# Patient Record
Sex: FEMALE | Race: WHITE | NOT HISPANIC OR LATINO | URBAN - METROPOLITAN AREA
[De-identification: names, ages, dates, MRNs, and addresses within clinical notes are randomized per-mention and may not be internally consistent; named-entity substitution may affect disease eponyms.]

---

## 2024-10-24 ENCOUNTER — EMERGENCY (EMERGENCY)
Facility: HOSPITAL | Age: 89
LOS: 0 days | Discharge: HOME | End: 2024-10-25
Attending: STUDENT IN AN ORGANIZED HEALTH CARE EDUCATION/TRAINING PROGRAM
Payer: MEDICARE

## 2024-10-24 ENCOUNTER — TRANSCRIPTION ENCOUNTER (OUTPATIENT)
Age: 89
End: 2024-10-24

## 2024-10-24 VITALS
TEMPERATURE: 98 F | HEIGHT: 62 IN | RESPIRATION RATE: 18 BRPM | HEART RATE: 66 BPM | WEIGHT: 149.03 LBS | OXYGEN SATURATION: 95 % | SYSTOLIC BLOOD PRESSURE: 147 MMHG | DIASTOLIC BLOOD PRESSURE: 71 MMHG

## 2024-10-24 DIAGNOSIS — Z79.01 LONG TERM (CURRENT) USE OF ANTICOAGULANTS: ICD-10-CM

## 2024-10-24 DIAGNOSIS — E03.9 HYPOTHYROIDISM, UNSPECIFIED: ICD-10-CM

## 2024-10-24 DIAGNOSIS — R79.82 ELEVATED C-REACTIVE PROTEIN (CRP): ICD-10-CM

## 2024-10-24 DIAGNOSIS — M25.461 EFFUSION, RIGHT KNEE: ICD-10-CM

## 2024-10-24 DIAGNOSIS — I69.351 HEMIPLEGIA AND HEMIPARESIS FOLLOWING CEREBRAL INFARCTION AFFECTING RIGHT DOMINANT SIDE: ICD-10-CM

## 2024-10-24 DIAGNOSIS — I48.91 UNSPECIFIED ATRIAL FIBRILLATION: ICD-10-CM

## 2024-10-24 DIAGNOSIS — M25.561 PAIN IN RIGHT KNEE: ICD-10-CM

## 2024-10-24 LAB
ALBUMIN SERPL ELPH-MCNC: 2.9 G/DL — LOW (ref 3.3–5)
ALP SERPL-CCNC: 74 U/L — SIGNIFICANT CHANGE UP (ref 40–120)
ALT FLD-CCNC: 16 U/L — SIGNIFICANT CHANGE UP (ref 12–78)
ANION GAP SERPL CALC-SCNC: 8 MMOL/L — SIGNIFICANT CHANGE UP (ref 5–17)
AST SERPL-CCNC: 24 U/L — SIGNIFICANT CHANGE UP (ref 15–37)
BASOPHILS # BLD AUTO: 0.02 K/UL — SIGNIFICANT CHANGE UP (ref 0–0.2)
BASOPHILS NFR BLD AUTO: 0.2 % — SIGNIFICANT CHANGE UP (ref 0–2)
BILIRUB SERPL-MCNC: 1.4 MG/DL — HIGH (ref 0.2–1.2)
BUN SERPL-MCNC: 19 MG/DL — SIGNIFICANT CHANGE UP (ref 7–23)
CALCIUM SERPL-MCNC: 8.9 MG/DL — SIGNIFICANT CHANGE UP (ref 8.5–10.1)
CHLORIDE SERPL-SCNC: 103 MMOL/L — SIGNIFICANT CHANGE UP (ref 96–108)
CO2 SERPL-SCNC: 25 MMOL/L — SIGNIFICANT CHANGE UP (ref 22–31)
CREAT SERPL-MCNC: 1 MG/DL — SIGNIFICANT CHANGE UP (ref 0.5–1.3)
EGFR: 54 ML/MIN/1.73M2 — LOW
EOSINOPHIL # BLD AUTO: 0.02 K/UL — SIGNIFICANT CHANGE UP (ref 0–0.5)
EOSINOPHIL NFR BLD AUTO: 0.2 % — SIGNIFICANT CHANGE UP (ref 0–6)
ERYTHROCYTE [SEDIMENTATION RATE] IN BLOOD: 30 MM/HR — HIGH (ref 0–20)
GLUCOSE SERPL-MCNC: 159 MG/DL — HIGH (ref 70–99)
HCT VFR BLD CALC: 39.3 % — SIGNIFICANT CHANGE UP (ref 34.5–45)
HCT VFR BLD CALC: 40 % — SIGNIFICANT CHANGE UP (ref 34.5–45)
HGB BLD-MCNC: 12.8 G/DL — SIGNIFICANT CHANGE UP (ref 11.5–15.5)
HGB BLD-MCNC: 13.1 G/DL — SIGNIFICANT CHANGE UP (ref 11.5–15.5)
IMM GRANULOCYTES NFR BLD AUTO: 0.3 % — SIGNIFICANT CHANGE UP (ref 0–0.9)
LYMPHOCYTES # BLD AUTO: 2.76 K/UL — SIGNIFICANT CHANGE UP (ref 1–3.3)
LYMPHOCYTES # BLD AUTO: 28.2 % — SIGNIFICANT CHANGE UP (ref 13–44)
MCHC RBC-ENTMCNC: 28.8 PG — SIGNIFICANT CHANGE UP (ref 27–34)
MCHC RBC-ENTMCNC: 29.2 PG — SIGNIFICANT CHANGE UP (ref 27–34)
MCHC RBC-ENTMCNC: 32.6 G/DL — SIGNIFICANT CHANGE UP (ref 32–36)
MCHC RBC-ENTMCNC: 32.8 G/DL — SIGNIFICANT CHANGE UP (ref 32–36)
MCV RBC AUTO: 88.3 FL — SIGNIFICANT CHANGE UP (ref 80–100)
MCV RBC AUTO: 89.1 FL — SIGNIFICANT CHANGE UP (ref 80–100)
MONOCYTES # BLD AUTO: 1.34 K/UL — HIGH (ref 0–0.9)
MONOCYTES NFR BLD AUTO: 13.7 % — SIGNIFICANT CHANGE UP (ref 2–14)
NEUTROPHILS # BLD AUTO: 5.61 K/UL — SIGNIFICANT CHANGE UP (ref 1.8–7.4)
NEUTROPHILS NFR BLD AUTO: 57.4 % — SIGNIFICANT CHANGE UP (ref 43–77)
NRBC # BLD: 0 /100 WBCS — SIGNIFICANT CHANGE UP (ref 0–0)
NRBC # BLD: 0 /100 WBCS — SIGNIFICANT CHANGE UP (ref 0–0)
PLATELET # BLD AUTO: 173 K/UL — SIGNIFICANT CHANGE UP (ref 150–400)
PLATELET # BLD AUTO: 178 K/UL — SIGNIFICANT CHANGE UP (ref 150–400)
POTASSIUM SERPL-MCNC: 4.7 MMOL/L — SIGNIFICANT CHANGE UP (ref 3.5–5.3)
POTASSIUM SERPL-SCNC: 4.7 MMOL/L — SIGNIFICANT CHANGE UP (ref 3.5–5.3)
PROT SERPL-MCNC: 7.3 GM/DL — SIGNIFICANT CHANGE UP (ref 6–8.3)
RBC # BLD: 4.45 M/UL — SIGNIFICANT CHANGE UP (ref 3.8–5.2)
RBC # BLD: 4.49 M/UL — SIGNIFICANT CHANGE UP (ref 3.8–5.2)
RBC # FLD: 15.3 % — HIGH (ref 10.3–14.5)
RBC # FLD: 15.4 % — HIGH (ref 10.3–14.5)
SODIUM SERPL-SCNC: 136 MMOL/L — SIGNIFICANT CHANGE UP (ref 135–145)
WBC # BLD: 9.49 K/UL — SIGNIFICANT CHANGE UP (ref 3.8–10.5)
WBC # BLD: 9.78 K/UL — SIGNIFICANT CHANGE UP (ref 3.8–10.5)
WBC # FLD AUTO: 9.49 K/UL — SIGNIFICANT CHANGE UP (ref 3.8–10.5)
WBC # FLD AUTO: 9.78 K/UL — SIGNIFICANT CHANGE UP (ref 3.8–10.5)

## 2024-10-24 PROCEDURE — 99285 EMERGENCY DEPT VISIT HI MDM: CPT

## 2024-10-24 PROCEDURE — 73562 X-RAY EXAM OF KNEE 3: CPT | Mod: 26,RT

## 2024-10-24 RX ORDER — ACETAMINOPHEN 325 MG
975 TABLET ORAL ONCE
Refills: 0 | Status: COMPLETED | OUTPATIENT
Start: 2024-10-24 | End: 2024-10-24

## 2024-10-24 RX ADMIN — Medication 975 MILLIGRAM(S): at 16:35

## 2024-10-24 RX ADMIN — Medication 975 MILLIGRAM(S): at 17:40

## 2024-10-24 NOTE — ED PROVIDER NOTE - CLINICAL SUMMARY MEDICAL DECISION MAKING FREE TEXT BOX
89-year-old female PMH CVA with residual right-sided weakness, A-fib on Eliquis, metoprolol, digoxin presenting with right knee pain and swelling and difficulty ambulating x 2 days.  Patient had stroke and of August,  had been going to PT therapy, has a new physical therapist come to the house 2 days ago and noticed swelling and pain after session.  Was unable to ambulate yesterday.  Denies fevers or chills.  PE as above, differential diagnosis including but not limited to 89-year-old female PMH CVA with residual right-sided weakness, A-fib on Eliquis, metoprolol, digoxin presenting with right knee pain and swelling and difficulty ambulating x 2 days.  Patient had stroke and of August,  had been going to PT therapy, has a new physical therapist come to the house 2 days ago and noticed swelling and pain after session.  Was unable to ambulate yesterday.  Denies fevers or chills.  PE as above, possible overuse with new PT, given mild warmth and dec ROM will consult ortho for concern of infectious process, plan for labs, declining pain control at this time.

## 2024-10-24 NOTE — ED PROVIDER NOTE - NSFOLLOWUPCLINICS_GEN_ALL_ED_FT
[facility]  Rheumatology  [address]  [place] [address]  Phone: [phone]  Fax:     [facility] Sports Medicine  Sports Medicine  [address] [place] [address]  Phone: [phone]  Fax:

## 2024-10-24 NOTE — ED PROVIDER NOTE - ATTENDING APP SHARED VISIT CONTRIBUTION OF CARE
89F pmhx CVA, afib on eliquis, recently in rehab, who presents for progressive right knee swelling/pain duration x 2 days. Reports recently started with new physical therapist 2 days ago. Denies fever, chest pain, sob  On exam pt is aox3, nad, heart rrr, normal respirations, abd soft ntnd, +limited R knee ROM due to swelling/pain overlying right suprapatellar region without overlying rash, + warmth, distal pulses intact, no sensory deficits  plan - xray knee, suspected arthritic joint effusion, esr/crp to further risk stratify to rule out septic arthritis given difficulty with ROM and warmth, ortho consulted.

## 2024-10-24 NOTE — ED ADULT NURSE NOTE - OBJECTIVE STATEMENT
Patient is alert and oriented x4. Came in for right knee pain after doing physical therapy 2 days ago. No falls or trauma. Right knee swollen. No redness.

## 2024-10-24 NOTE — ED ADULT NURSE NOTE - NSFALLHARMRISKINTERV_ED_ALL_ED
Assistance OOB with selected safe patient handling equipment if applicable/Assistance with ambulation/Communicate risk of Fall with Harm to all staff, patient, and family/Monitor gait and stability/Provide visual cue: red socks, yellow wristband, yellow gown, etc/Reinforce activity limits and safety measures with patient and family/Bed in lowest position, wheels locked, appropriate side rails in place/Call bell, personal items and telephone in reach/Instruct patient to call for assistance before getting out of bed/chair/stretcher/Non-slip footwear applied when patient is off stretcher/[place] to call system/Physically safe environment - no spills, clutter or unnecessary equipment/Purposeful Proactive Rounding/Room/bathroom lighting operational, light cord in reach

## 2024-10-24 NOTE — ED PROVIDER NOTE - PROGRESS NOTE DETAILS
Colletta NP- case discussed with ortho, requesting crp result prior to intervention. Patient agreeable with plan to await result. Davila DO: CRP still without result, pt NPO , d/w ortho who recommended pt to wait for CRP as if not significantly elevated then they would recommend against joint aspiration due to increased risk of introducing infcn . core lab was contacted at [phone] - states specimen was received at 12AM and estimates 1 hour til result, pt frustrated but updated and her R knee swelling is much improved and ROM much improved, given improvement with minimal intervention, suspicion for septic arthritis is now much lower than initial presentation as initially could not range joint - pt and son updated, they are willing to wait an hour but if result does not come back, they would like to be discharged, shared decision making discussed. Pt informed I will be signing out, s/o to evening team for follow up crp/ortho recommendations - pt signed out to me by Dr. Davila:    pt CRp elevated, ortho was repaged and make decision to tap knee. cell count not concerning per ortho, likely arthritis vs arthropathy. will give ACE wrap, give rheum and sports f/u

## 2024-10-24 NOTE — ED PROVIDER NOTE - PATIENT PORTAL LINK FT
You can access the FollowMyHealth Patient Portal offered by [facility] by registering at the following website: [email or web address]. By joining [facility]’s FollowMyHealth portal, you will also be able to view your health information using other applications (apps) compatible with our system.

## 2024-10-24 NOTE — CONSULT NOTE ADULT - SUBJECTIVE AND OBJECTIVE BOX
Patient is a 89yFemale who presents to ED with right knee painful effusion. Patient has a history of afib on eliquis and recent CVA. Had a stroke in August, developed right sided weakness and has been working in rehab and improving. Patient was discharged from rehab two weeks ago and has been having home PT. A new physical therapist came two days ago and afterwards, her aide has noticed that the patient's right knee is now swollen and very painful. Patient denies any recent trauma, numbness, or tingling. Denies any fevers or chills. States inability to walk immediately following the knee swelling. Denies having any other pain elsewhere besides her usual knee arthritic pain. No other orthopedic concerns at this time.    Medical Hx:      Meds:      Allergies:  No Known Allergies      PHYSICAL EXAM:  T(C): 36.6 (10-24-24 @ 14:19), Max: 36.6 (10-24-24 @ 14:19)  HR: 66 (10-24-24 @ 14:19) (66 - 66)  BP: 147/71 (10-24-24 @ 14:19) (147/71 - 147/71)  RR: 18 (10-24-24 @ 14:19) (18 - 18)  SpO2: 95% (10-24-24 @ 14:19) (95% - 95%)    Gen: NAD    Right Lower Extremity:  Skin clean and dry  Effusion present surrounding knee, nonerythematous, nontender to palpation, warm compared to contralateral side  TTP along joint line  Active ROM 0-30, passive ROM 0-30 limited by pain  No pain with micromotion  Able to SLR  Soft compartments  Negative calf ttp  +EHL/FHL/TA/GSc  SILT SPN, DPN, Tib, Saph, Eliana  DP+       Secondary Survey:   LLE/RUE/LUE: No TTP over bony prominences, SILT, palpable pulses, full/painless range of motion, compartments soft    Spine: No bony tenderness. No palpable stepoffs.     Imaging - XR R knee: no acute fractures, effusion present, bone on bone osteoarthritis, personal read      A/P: 89yFemale w/ right knee effusion, afebrile, no pain with micromotion, reassuring clinical exam, WBC normal, low suspicion for septic arthritis, more likely related to hemarthrosis secondary to Eliquis and osteoarthritis exacerbation    - Imaging findings reviewed and discussed with patient  - NWB LLE  - PT/OT  - Pain meds as needed  - Ice as tolerated  - No need for abx at this time  - Keep patient NPO for now  - FU ESR/CRP - ordered  - Based on ESR, may decide whether or not to tap the patient's knee        - All of the patient's questions and concerns were answered and addressed  - Discussed with Dr. Sherman who is in agreement with the above plan   Patient is a 89yFemale who presents to ED with right knee painful effusion. Patient has a history of afib on eliquis and recent CVA. Had a stroke in August, developed right sided weakness and has been working in rehab and improving. Patient was discharged from rehab two weeks ago and has been having home PT. A new physical therapist came two days ago and afterwards, her aide has noticed that the patient's right knee is now swollen and very painful. Patient denies any recent trauma, numbness, or tingling. Denies any fevers or chills. States inability to walk immediately following the knee swelling. Denies having any other pain elsewhere besides her usual knee arthritic pain. No other orthopedic concerns at this time.    Medical Hx:      Meds:      Allergies:  No Known Allergies      PHYSICAL EXAM:  T(C): 36.6 (10-24-24 @ 14:19), Max: 36.6 (10-24-24 @ 14:19)  HR: 66 (10-24-24 @ 14:19) (66 - 66)  BP: 147/71 (10-24-24 @ 14:19) (147/71 - 147/71)  RR: 18 (10-24-24 @ 14:19) (18 - 18)  SpO2: 95% (10-24-24 @ 14:19) (95% - 95%)    Gen: NAD    Right Lower Extremity:  Skin clean and dry  Effusion present surrounding knee, nonerythematous, nontender to palpation, warm compared to contralateral side  TTP along joint line  Active ROM 0-30, passive ROM 0-30 limited by pain  No pain with micromotion  Able to SLR  Soft compartments  Negative calf ttp  Motor: +EHL/FHL/TA/GSc  Sensory: +SILT SPN, DPN, Tib, Saph, Eliana  DP+       Secondary Assessment:  NC/AT, NTTP of clavicles, NTTP of C-,T-,L-Spine, NTTP of Pelvis  UEs: NTTP of Shoulders, Elbows, Wrists, Hands; NT with AROM/PROM of Shoulders, Elbows, Wrists, Hands; AIN/PIN/Med/Uln/Msc/Rad/Ax intact  LLE: Able to SLR, NT with Log Roll, NT with Heel Strike, NTTP of Hip, Knee, Ankle, Foot; NT with AROM/PROM of Hip, Knee, Ankle, Foot; Q/H/Gsc/TA/EHL/FHL intact    Imaging - XR R knee: no acute fractures, effusion present, bone on bone osteoarthritis, personal read    A/P: 89yFemale w/ right knee effusion, afebrile, no pain with micromotion, reassuring clinical exam, WBC normal, low suspicion for septic arthritis, more likely related to hemarthrosis secondary to Eliquis and osteoarthritis exacerbation    ***INCOMPLETE NOTE****  - Imaging findings reviewed and discussed with patient  - WBAT LLE  - PT/OT  - Pain meds as needed  - Ice as tolerated  - No need for abx at this time  - Hold DVT ppx   - Keep patient NPO for now  - FU ESR/CRP - ordered    ***INCOMPLETE NOTE****    To Discuss with Dr. Sherman and will provide further recommendations shortly.     Patient is a 89yFemale who presents to ED with right knee painful effusion. Patient has a history of afib on eliquis and recent CVA. Had a stroke in August, developed right sided weakness and has been working in rehab and improving. Patient was discharged from rehab two weeks ago and has been having home PT. A new physical therapist came two days ago and afterwards, her aide has noticed that the patient's right knee is now swollen and very painful. Patient denies any recent trauma, numbness, or tingling. Denies any fevers or chills. States inability to walk immediately following the knee swelling. Denies having any other pain elsewhere besides her usual knee arthritic pain. No other orthopedic concerns at this time.    Medical Hx:      Meds:      Allergies:  No Known Allergies      PHYSICAL EXAM:  T(C): 36.6 (10-24-24 @ 14:19), Max: 36.6 (10-24-24 @ 14:19)  HR: 66 (10-24-24 @ 14:19) (66 - 66)  BP: 147/71 (10-24-24 @ 14:19) (147/71 - 147/71)  RR: 18 (10-24-24 @ 14:19) (18 - 18)  SpO2: 95% (10-24-24 @ 14:19) (95% - 95%)    Gen: NAD    Right Lower Extremity:  Skin clean and dry  Effusion present surrounding knee, nonerythematous, nontender to palpation, warm compared to contralateral side  TTP along joint line  Active ROM 0-30, passive ROM 0-30 limited by pain  No pain with micromotion  Able to SLR  Soft compartments  Negative calf ttp  Motor: +EHL/FHL/TA/GSc  Sensory: +SILT SPN, DPN, Tib, Saph, Eliana  DP+       Secondary Assessment:  NC/AT, NTTP of clavicles, NTTP of C-,T-,L-Spine, NTTP of Pelvis  UEs: NTTP of Shoulders, Elbows, Wrists, Hands; NT with AROM/PROM of Shoulders, Elbows, Wrists, Hands; AIN/PIN/Med/Uln/Msc/Rad/Ax intact  LLE: Able to SLR, NT with Log Roll, NT with Heel Strike, NTTP of Hip, Knee, Ankle, Foot; NT with AROM/PROM of Hip, Knee, Ankle, Foot; Q/H/Gsc/TA/EHL/FHL intact    Imaging - XR R knee: no acute fractures, effusion present, bone on bone osteoarthritis, personal read    A/P: 89yFemale w/ right knee effusion, afebrile, no pain with micromotion, reassuring clinical exam, WBC normal, low suspicion for septic arthritis, more likely related to hemarthrosis secondary to Eliquis and osteoarthritis exacerbation    PROCEDURE  The risks and benefits of arthrocentesis were explained to the patient who agreed to proceed with aspiration. Under aseptic conditions, 62 cc of straw colored fluid was removed from the affected joint. This fluid was distributed to a sterile specimen cup for Gram stain and culture as well as a laboratory tube for cell count and crystal analysis. The patient tolerated the procedure well, and there were no complications. The patient was neurovascularly intact following the procedure.    ASSESSMENT & PLAN  89yFemale w/ R knee effusion, afebrile, no pain with micromotion, reassuring clinical exam, WBC normal, low suspicion for septic arthritis, more likely related osteoarthritis exacerbation. Given elevated ESR/CRP, Arthrocentesis performed in ED. Ddx includes prepatellar bursitis vs. cellulitis vs. gout vs. pseudogout vs. other inflammatory arthropathy.      ***NOTE INCOMPLETE. WIll PROVIDE FURTHER RECOMMENDATIONS SHORTLY***     -WBAT RLE, ACE wrap PRN  -ESR/CRP: 30/91  -FU cell count, crystals, Gram stain, and synovial fluid culture  -Keep NPO until cell count, Gram stain, crystals, and prelim synovial fluid culture results in the event pt needs an I&D  -pain control  -ice/cold compress, elevation    ***NOTE INCOMPLETE. WIll PROVIDE FURTHER RECOMMENDATIONS SHORTLY***  Patient is a 89yFemale who presents to ED with right knee painful effusion. Patient has a history of afib on eliquis and recent CVA. Had a stroke in August, developed right sided weakness and has been working in rehab and improving. Patient was discharged from rehab two weeks ago and has been having home PT. A new physical therapist came two days ago and afterwards, her aide has noticed that the patient's right knee is now swollen and very painful. Patient denies any recent trauma, numbness, or tingling. Denies any fevers or chills. States inability to walk immediately following the knee swelling. Denies having any other pain elsewhere besides her usual knee arthritic pain. No other orthopedic concerns at this time.    Medical Hx:      Meds:      Allergies:  No Known Allergies      PHYSICAL EXAM:  T(C): 36.6 (10-24-24 @ 14:19), Max: 36.6 (10-24-24 @ 14:19)  HR: 66 (10-24-24 @ 14:19) (66 - 66)  BP: 147/71 (10-24-24 @ 14:19) (147/71 - 147/71)  RR: 18 (10-24-24 @ 14:19) (18 - 18)  SpO2: 95% (10-24-24 @ 14:19) (95% - 95%)    Gen: NAD    Right Lower Extremity:  Skin clean and dry  Effusion present surrounding knee, nonerythematous, nontender to palpation, warm compared to contralateral side  TTP along joint line  Active ROM 0-30, passive ROM 0-30 limited by pain  No pain with micromotion  Able to SLR  Soft compartments  Negative calf ttp  Motor: +EHL/FHL/TA/GSc  Sensory: +SILT SPN, DPN, Tib, Saph, Eliana  DP+       Secondary Assessment:  NC/AT, NTTP of clavicles, NTTP of C-,T-,L-Spine, NTTP of Pelvis  UEs: NTTP of Shoulders, Elbows, Wrists, Hands; NT with AROM/PROM of Shoulders, Elbows, Wrists, Hands; AIN/PIN/Med/Uln/Msc/Rad/Ax intact  LLE: Able to SLR, NT with Log Roll, NT with Heel Strike, NTTP of Hip, Knee, Ankle, Foot; NT with AROM/PROM of Hip, Knee, Ankle, Foot; Q/H/Gsc/TA/EHL/FHL intact    Imaging - XR R knee: no acute fractures, effusion present, bone on bone osteoarthritis, personal read    A/P: 89yFemale w/ right knee effusion, afebrile, no pain with micromotion, reassuring clinical exam, WBC normal, low suspicion for septic arthritis, more likely related to hemarthrosis secondary to Eliquis and osteoarthritis exacerbation    PROCEDURE  The risks and benefits of arthrocentesis were explained to the patient who agreed to proceed with aspiration. Under aseptic conditions, 62 cc of straw colored fluid was removed from the affected joint. This fluid was distributed to a sterile specimen cup for Gram stain and culture as well as a laboratory tube for cell count and crystal analysis. The patient tolerated the procedure well, and there were no complications. The patient was neurovascularly intact following the procedure.    ASSESSMENT & PLAN  89yFemale w/ R knee effusion, afebrile, no pain with micromotion, reassuring clinical exam, WBC normal, low suspicion for septic arthritis, more likely related osteoarthritis exacerbation. Given elevated ESR/CRP, Arthrocentesis performed in ED. Ddx includes prepatellar bursitis vs. cellulitis vs. gout vs. pseudogout vs. other inflammatory arthropathy.    -WBAT RLE, ACE wrap PRN  -ESR/CRP: 30/91  -Cell count: 28k  -FU crystals, Gram stain, and synovial fluid culture  -pain control  -ice/cold compress, elevation  -FU outpatient with primary care physician in one week    No acute orthopaedic surgical intervention indicated at this time. This patient is orthopaedically stable for discharge.   Patient to follow up with PCP as an outpatient for further evaluation and management.   All of the patient's questions and concerns were answered and addressed.    To Discuss with Dr. Tony and provide further recommendations as needed.

## 2024-10-24 NOTE — ED PROVIDER NOTE - PHYSICAL EXAMINATION
Gen: NAD, AOx3, able to make needs known, non-toxic  Head: NCAT  HEENT: EOMI, oral mucosa moist, normal conjunctiva  Lung: CTAB, no respiratory distress, no wheezes/rhonchi/rales B/L, speaking in full sentences  CV: RRR, no murmurs  Abd: non distended, soft, nontender, no guarding, no CVA tenderness  MSK: rt knee effusion, dec ROM , unable to fully extend, held in flexed position, DPP intact  Neuro: Appears non focal  Skin: Warm, well perfused, no rash  Psych: normal affect Gen: NAD, AOx3, able to make needs known, non-toxic  Head: NCAT  HEENT: EOMI, oral mucosa moist, normal conjunctiva  Lung: CTAB, no respiratory distress, no wheezes/rhonchi/rales B/L, speaking in full sentences  CV: irregular, no murmurs  Abd: non distended, soft, nontender, no guarding, no CVA tenderness  MSK: rt knee effusion, dec ROM , unable to fully extend, held in flexed position, DPP intact  Neuro: Appears non focal  Skin: Warm, well perfused, no rash  Psych: normal affect

## 2024-10-24 NOTE — ED PROVIDER NOTE - OBJECTIVE STATEMENT
89-year-old female PMH A-fib on Eliquis, metoprolol, digoxin, recent CVA with residual rt sided weakness, hypothyroid presenting to emergency department with right knee pain and swelling x 2 days.  Patient states she had stroke in August was [place]  PT therapist come to the house 2 days ago and noticed her knee became red swollen and painful that evening.  Was unable to ambulate yesterday.

## 2024-10-25 VITALS
SYSTOLIC BLOOD PRESSURE: 140 MMHG | OXYGEN SATURATION: 98 % | TEMPERATURE: 98 F | RESPIRATION RATE: 16 BRPM | DIASTOLIC BLOOD PRESSURE: 65 MMHG | HEART RATE: 68 BPM

## 2024-10-25 LAB
CRP SERPL-MCNC: 135 MG/L — HIGH
CRP SERPL-MCNC: 91 MG/L — HIGH
GRAM STN FLD: SIGNIFICANT CHANGE UP
JOINT PATHOGENS PNL SNV NAA+NON-PROBE: SIGNIFICANT CHANGE UP
JOINT PCR SOURCE: SIGNIFICANT CHANGE UP
SPECIMEN SOURCE: SIGNIFICANT CHANGE UP
SYNOVIAL CRYSTALS CLARITY: ABNORMAL
SYNOVIAL CRYSTALS COLOR: YELLOW
SYNOVIAL CRYSTALS ID: ABNORMAL
SYNOVIAL CRYSTALS TUBE: SIGNIFICANT CHANGE UP

## 2024-10-30 PROBLEM — I63.9 CEREBRAL INFARCTION, UNSPECIFIED: Chronic | Status: ACTIVE | Noted: 2024-10-25

## 2024-10-30 PROBLEM — I48.91 UNSPECIFIED ATRIAL FIBRILLATION: Chronic | Status: ACTIVE | Noted: 2024-10-25

## 2024-10-30 PROBLEM — I10 ESSENTIAL (PRIMARY) HYPERTENSION: Chronic | Status: ACTIVE | Noted: 2024-10-25

## 2024-10-31 ENCOUNTER — APPOINTMENT (OUTPATIENT)
Dept: ORTHOPEDIC SURGERY | Facility: CLINIC | Age: 89
End: 2024-10-31
Payer: MEDICARE

## 2024-10-31 VITALS — BODY MASS INDEX: 26.68 KG/M2 | HEIGHT: 62 IN | WEIGHT: 145 LBS

## 2024-10-31 DIAGNOSIS — E78.00 PURE HYPERCHOLESTEROLEMIA, UNSPECIFIED: ICD-10-CM

## 2024-10-31 DIAGNOSIS — M25.461 EFFUSION, RIGHT KNEE: ICD-10-CM

## 2024-10-31 DIAGNOSIS — Z86.39 PERSONAL HISTORY OF OTHER ENDOCRINE, NUTRITIONAL AND METABOLIC DISEASE: ICD-10-CM

## 2024-10-31 PROBLEM — Z00.00 ENCOUNTER FOR PREVENTIVE HEALTH EXAMINATION: Status: ACTIVE | Noted: 2024-10-31

## 2024-10-31 PROCEDURE — 20610 DRAIN/INJ JOINT/BURSA W/O US: CPT | Mod: RT

## 2024-10-31 PROCEDURE — 99204 OFFICE O/P NEW MOD 45 MIN: CPT | Mod: 25

## 2025-05-14 NOTE — ED PROVIDER NOTE - CONTACT TIME
24-Oct-2024 15:46 [Cough] : cough [Memory Loss] : memory loss [Unsteady Walking] : ataxia [Negative] : Psychiatric [Shortness Of Breath] : no shortness of breath [Wheezing] : no wheezing [Dyspnea on Exertion] : no dyspnea on exertion [Headache] : no headache [Dizziness] : no dizziness [Fainting] : no fainting

## 2025-05-23 ENCOUNTER — APPOINTMENT (OUTPATIENT)
Dept: ORTHOPEDIC SURGERY | Facility: CLINIC | Age: 89
End: 2025-05-23

## 2025-05-29 ENCOUNTER — APPOINTMENT (OUTPATIENT)
Dept: ORTHOPEDIC SURGERY | Facility: CLINIC | Age: 89
End: 2025-05-29
Payer: MEDICARE

## 2025-05-29 VITALS — HEIGHT: 62 IN | BODY MASS INDEX: 29.44 KG/M2 | WEIGHT: 160 LBS

## 2025-05-29 DIAGNOSIS — S32.030A WEDGE COMPRESSION FRACTURE OF THIRD LUMBAR VERTEBRA, INITIAL ENCOUNTER FOR CLOSED FRACTURE: ICD-10-CM

## 2025-05-29 DIAGNOSIS — M54.50 LOW BACK PAIN, UNSPECIFIED: ICD-10-CM

## 2025-05-29 DIAGNOSIS — M51.9 UNSPECIFIED THORACIC, THORACOLUMBAR AND LUMBOSACRAL INTERVERTEBRAL DISC DISORDER: ICD-10-CM

## 2025-05-29 PROBLEM — S32.020A COMPRESSION FRACTURE OF L2: Status: ACTIVE | Noted: 2025-05-29

## 2025-05-29 PROCEDURE — 73502 X-RAY EXAM HIP UNI 2-3 VIEWS: CPT

## 2025-05-29 PROCEDURE — 99214 OFFICE O/P EST MOD 30 MIN: CPT

## 2025-05-29 PROCEDURE — 72100 X-RAY EXAM L-S SPINE 2/3 VWS: CPT

## 2025-05-31 ENCOUNTER — APPOINTMENT (OUTPATIENT)
Dept: MRI IMAGING | Facility: CLINIC | Age: 89
End: 2025-05-31
Payer: MEDICARE

## 2025-05-31 PROCEDURE — 72148 MRI LUMBAR SPINE W/O DYE: CPT

## 2025-06-03 ENCOUNTER — APPOINTMENT (OUTPATIENT)
Dept: ORTHOPEDIC SURGERY | Facility: CLINIC | Age: 89
End: 2025-06-03
Payer: MEDICARE

## 2025-06-03 DIAGNOSIS — S32.020A WEDGE COMPRESSION FRACTURE OF SECOND LUMBAR VERTEBRA, INITIAL ENCOUNTER FOR CLOSED FRACTURE: ICD-10-CM

## 2025-06-03 DIAGNOSIS — M48.54XA COLLAPSED VERTEBRA, NOT ELSEWHERE CLASSIFIED, THORACIC REGION, INITIAL ENCOUNTER FOR FRACTURE: ICD-10-CM

## 2025-06-03 DIAGNOSIS — S32.010A WEDGE COMPRESSION FRACTURE OF FIRST LUMBAR VERTEBRA, INITIAL ENCOUNTER FOR CLOSED FRACTURE: ICD-10-CM

## 2025-06-03 PROCEDURE — 99215 OFFICE O/P EST HI 40 MIN: CPT

## 2025-06-05 ENCOUNTER — APPOINTMENT (OUTPATIENT)
Dept: ORTHOPEDIC SURGERY | Facility: CLINIC | Age: 89
End: 2025-06-05

## 2025-06-06 RX ORDER — TRAMADOL HYDROCHLORIDE 25 MG/1
25 TABLET, COATED ORAL EVERY 8 HOURS
Qty: 21 | Refills: 0 | Status: ACTIVE | COMMUNITY
Start: 2025-06-03 | End: 1900-01-01